# Patient Record
Sex: FEMALE | Race: WHITE | NOT HISPANIC OR LATINO | Employment: OTHER | RURAL
[De-identification: names, ages, dates, MRNs, and addresses within clinical notes are randomized per-mention and may not be internally consistent; named-entity substitution may affect disease eponyms.]

---

## 2021-07-29 DIAGNOSIS — R42 DIZZINESS: ICD-10-CM

## 2021-07-29 DIAGNOSIS — R42 VERTIGO: Primary | ICD-10-CM

## 2021-08-03 ENCOUNTER — CLINICAL SUPPORT (OUTPATIENT)
Dept: REHABILITATION | Facility: HOSPITAL | Age: 86
End: 2021-08-03
Payer: MEDICARE

## 2021-08-03 DIAGNOSIS — R42 DIZZINESS: ICD-10-CM

## 2021-08-03 DIAGNOSIS — R42 VERTIGO: ICD-10-CM

## 2021-08-03 PROCEDURE — 97110 THERAPEUTIC EXERCISES: CPT | Mod: PN

## 2021-08-03 PROCEDURE — 97161 PT EVAL LOW COMPLEX 20 MIN: CPT | Mod: PN

## 2024-06-03 ENCOUNTER — PROCEDURE VISIT (OUTPATIENT)
Dept: DERMATOLOGY | Facility: CLINIC | Age: 89
End: 2024-06-03
Payer: MEDICARE

## 2024-06-03 VITALS — HEART RATE: 87 BPM | DIASTOLIC BLOOD PRESSURE: 78 MMHG | SYSTOLIC BLOOD PRESSURE: 189 MMHG

## 2024-06-03 DIAGNOSIS — D48.9 NEOPLASM OF UNCERTAIN BEHAVIOR: ICD-10-CM

## 2024-06-03 DIAGNOSIS — C44.329 SQUAMOUS CELL CARCINOMA OF SKIN OF RIGHT CHEEK: Primary | ICD-10-CM

## 2024-06-03 PROCEDURE — 99499 UNLISTED E&M SERVICE: CPT | Mod: ,,, | Performed by: STUDENT IN AN ORGANIZED HEALTH CARE EDUCATION/TRAINING PROGRAM

## 2024-06-03 PROCEDURE — 11102 TANGNTL BX SKIN SINGLE LES: CPT | Mod: XS,,, | Performed by: STUDENT IN AN ORGANIZED HEALTH CARE EDUCATION/TRAINING PROGRAM

## 2024-06-03 PROCEDURE — 88305 TISSUE EXAM BY PATHOLOGIST: CPT | Mod: 26,,, | Performed by: PATHOLOGY

## 2024-06-03 PROCEDURE — 17311 MOHS 1 STAGE H/N/HF/G: CPT | Mod: ,,, | Performed by: STUDENT IN AN ORGANIZED HEALTH CARE EDUCATION/TRAINING PROGRAM

## 2024-06-03 PROCEDURE — 13132 CMPLX RPR F/C/C/M/N/AX/G/H/F: CPT | Mod: 51,XS,, | Performed by: STUDENT IN AN ORGANIZED HEALTH CARE EDUCATION/TRAINING PROGRAM

## 2024-06-03 PROCEDURE — 88305 TISSUE EXAM BY PATHOLOGIST: CPT | Mod: TC,SUR | Performed by: STUDENT IN AN ORGANIZED HEALTH CARE EDUCATION/TRAINING PROGRAM

## 2024-06-03 RX ORDER — LEVOTHYROXINE SODIUM 75 UG/1
75 TABLET ORAL
COMMUNITY
Start: 2024-05-23

## 2024-06-03 RX ORDER — MUPIROCIN 20 MG/G
OINTMENT TOPICAL DAILY
Qty: 22 G | Refills: 3 | Status: SHIPPED | OUTPATIENT
Start: 2024-06-03

## 2024-06-03 RX ORDER — ESTRADIOL 1 MG/1
1 TABLET ORAL
COMMUNITY
Start: 2024-03-21

## 2024-06-03 RX ORDER — SULFAMETHOXAZOLE AND TRIMETHOPRIM 800; 160 MG/1; MG/1
1 TABLET ORAL 2 TIMES DAILY
COMMUNITY
Start: 2024-02-19 | End: 2024-06-03

## 2024-06-03 NOTE — PROGRESS NOTES
Mohs Surgery Operative Note    Patient name: Donna Tadeo  Date: 06/03/2024    Mohs accession #:   Previous dermpath accession #: [id number]  Location: [facility]  Preop diagnosis:SCC  Postop diagnosis: Same  Mohs AUC score: 9  Number of stages: 1  Preop size: 1.5 x 1.5 cm   Postop size: 1.9 x 1.9 cm  Depth of defect: fat  Repair type: Complex   Amount of lidocaine used: 12 cc of 2%   Surgeon and Pathologist: CURT Rodriguez MD     Indications for Mohs Surgery    Removal of the patient's tumor is complicated by the following clinical features: Clinical area critical for tissue conservation (Area H: central face, eyelids, eyebrows, nose, lips, chin, ear, periauricular, temple, genitalia, hands, feet, ankles, nail units and areola) and Poorly-defined clinical tumor borders    Based on my medical judgement, Mohs surgery is the most appropriate treatment for this cancer compared to other treatments. I discussed alternative treatments to Mohs surgery and specifically discussed the risks and benefits of curettage, excision with permanent sections, and foregoing treatment. The rationale for Mohs was explained to the patient and consent was obtained. The risks, benefits and alternatives to therapy were discussed in detail. Specifically, the risks of infection, scarring, bleeding, prolonged wound healing, incomplete removal, allergy to anesthesia, nerve injury and recurrence were addressed. Prior to the procedure, the treatment site was clearly identified and confirmed by the patient. All components of Universal Protocol/PAUSE Rule completed. BEVERLEY Rodriguez MD operated in two distinct and integrated capacities as the surgeon and pathologist.    STAGE I:  The patient was placed on the operating table. The cancer was identified and outlined. The entire surgical field was prepped with chlorhexidine The surgical site was anesthetized using Lidocaine 1% with epinephrine 1:100,000 buffered with sodium bicarbonate 8.4% in a 1:10  ratio.    The area of clinically apparent tumor was debulked with a 2 mm curette. The layer of tissue was then surgically excised using a #15 blade and was then transferred onto a specimen sheet maintaining the orientation of the specimen. Hemostasis was obtained using monopolar electrodesiccation. The wound site was then covered with a dressing while the tissue samples were processed for examination.    The specimen was oriented, mapped and divided. Each section was then inked and processed in the Mohs lab using the Mohs protocol and submitted for frozen section. The histopathologic sections were reviewed by the surgeon in conjunction with the reference map.     Total blocks: 1 Total slides: 3    Frozen section analysis revealed: No additional tumor identified on microscopic examination by the surgeon. Histology: No malignant cells seen in the sections examined.    Additional Histologic Findings: none     REPAIR: Complex Closure    Primary Surgeon : CURT Rodriguez MD   Repair Size: 4.5 cm  Sutures:  4-0 monocryl; 5-0 prolene   Width of underminin cm   Free margin involved: no  Presence of exposed bone/cartilage/tendon/named neurovascular structure: no  Use of retention sutures: No  Indication for complex repair: Wide undermining at least the width of the defect on one side needed to facilitate a lower tension closure     The defect was identified and a marking pen was used to plan the repair. The area was infiltrated with Lidocaine 1% with epinephrine 1:100,000 buffered with sodium bicarbonate 8.4% in a 1:10 ratio, prepped with chlorhexidine and draped with sterile towels.  The wound was debeveled and undermined widely to the width listed as above. Cones were excised within relaxed skin tension lines on both sides of the defect. Hemostasis was obtained using monopolar electrodesiccation. The dermis and subcutaneous tissue were then approximated using buried vertical mattress sutures. Percutaneous simple running  sutures were carefully placed for maximum eversion and meticulous wound edge approximation. Careful attention was paid to avoid distorting any nearby free margins.    The wound was cleansed with saline and ointment was applied along the wound surface. A sterile pressure dressing was applied. Wound care instructions were given verbally and in writing. The patient left the operating suite in stable condition. Patient was informed that additional refinement of the resulting surgical scar may be used as a second stage of this reconstruction.    Isidro Rodriguez MD   Mohs Surgery, Dermatologic Oncology

## 2024-06-03 NOTE — PATIENT INSTRUCTIONS

## 2024-06-03 NOTE — PROGRESS NOTES
Mohs Surgery Consult Note    Donna Tadeo is a 89 y.o. female who is referred by Dr. Yañez for evaluation of a SCC on the right cheek. She is also concerned about a tender papule on the vertex scalp.    Recurrent skin cancer: No    Preoperative Risk Factors:  Current Anticoagulants: No  Endocarditis / Rheumatic Fever hx: No  Immunocompromised: No  Prosthetic joint: No  Congenital heart defect: No  Prosthetic heart valve: No  Diabetic: No  Transplant: No  Pacemaker: No  Defibrillator:  No  Prior problem with local anesthesia: No  Tobacco History: No]  Clindamycin Allergy: No  Pregnant: no     Transmissible Diseases:  HIV No  Hepatitis B or C  No      Exam:  Limited skin exam is normal except for a  SCC  located on the right cheek  .    Pathologic Findings:  Accession # [id number]  Diagnosis: SCC    Assessment and Plan:  Treatment Options : The various treatment options for skin cancer removal were reviewed with the patient in detail. These include Mohs surgery with its high cure rate, excisional surgery, destructive treatment, radiation therapy, and various topical therapies.  Given the indications and high cure rate, the patient has agreed to proceed with Mohs.  Risks and Benefits : The rationale for Mohs was explained to the patient. The risks and benefits to therapy were discussed in detail. Specifically, the risks of infection, scarring, bleeding, dehiscence, hematoma, prolonged wound healing, incomplete removal, allergy to anesthesia, nerve injury, inability to clear the tumor, and recurrence were addressed. The treatment site was clearly identified and confirmed by the patient.    Plan:  Mohs Micrographic Surgery  - Right cheek  Antibiotics: mupirocin ointment daily     Plan: shave biopsy  - vertex of scalp    Neoplasm of Uncertain Behavior   - tender papule located on the vertex of scalp   Ddx includes: SCC    Shave biopsy      Pre-procedure Diagnosis: as above  Post_procedure Diagnosis: same  Estimated  Blood Loss: <1cc    Findings: None  Complications: None  Specimens: to pathology      Written informed consent was obtained after discussing risks including pain, bleeding, infection, recurrence and scarring. The biopsy site was sterilely prepped with alcohol, which was allowed to dry completely, then locally infiltrated with 1% lidocaine with epinephrine, ~3 cc total. A shave biopsy was obtained using a Dermablade/15 and the specimen was sent to dermatopathology. Aluminum chloride was used for hemostasis. Antibiotic ointment and a clean dressing were applied. The patient tolerated the procedure well without complications. Verbal and written wound care instructions were given.    MD Isidro Dougherty MD   Mohs Surgery/Dermatologic Oncology

## 2024-06-05 LAB
ESTROGEN SERPL-MCNC: NORMAL PG/ML
INSULIN SERPL-ACNC: NORMAL U[IU]/ML
LAB AP GROSS DESCRIPTION: NORMAL
LAB AP LABORATORY NOTES: NORMAL
LAB AP SPEC A DDX: NORMAL
LAB AP SPEC A MORPHOLOGY: NORMAL
LAB AP SPEC A PROCEDURE: NORMAL
T3RU NFR SERPL: NORMAL %

## 2024-06-17 ENCOUNTER — PROCEDURE VISIT (OUTPATIENT)
Dept: DERMATOLOGY | Facility: CLINIC | Age: 89
End: 2024-06-17
Payer: MEDICARE

## 2024-06-17 VITALS — HEART RATE: 73 BPM | SYSTOLIC BLOOD PRESSURE: 156 MMHG | DIASTOLIC BLOOD PRESSURE: 76 MMHG

## 2024-06-17 DIAGNOSIS — D48.9 NEOPLASM OF UNCERTAIN BEHAVIOR: Primary | ICD-10-CM

## 2024-06-17 DIAGNOSIS — C44.42 SQUAMOUS CELL CARCINOMA OF SCALP: ICD-10-CM

## 2024-06-17 PROCEDURE — 99499 UNLISTED E&M SERVICE: CPT | Mod: ,,, | Performed by: STUDENT IN AN ORGANIZED HEALTH CARE EDUCATION/TRAINING PROGRAM

## 2024-06-17 PROCEDURE — 11102 TANGNTL BX SKIN SINGLE LES: CPT | Mod: XS,,, | Performed by: STUDENT IN AN ORGANIZED HEALTH CARE EDUCATION/TRAINING PROGRAM

## 2024-06-17 PROCEDURE — 17311 MOHS 1 STAGE H/N/HF/G: CPT | Mod: ,,, | Performed by: STUDENT IN AN ORGANIZED HEALTH CARE EDUCATION/TRAINING PROGRAM

## 2024-06-17 PROCEDURE — 88305 TISSUE EXAM BY PATHOLOGIST: CPT | Mod: 26,,, | Performed by: PATHOLOGY

## 2024-06-17 PROCEDURE — 13121 CMPLX RPR S/A/L 2.6-7.5 CM: CPT | Mod: 51,,, | Performed by: STUDENT IN AN ORGANIZED HEALTH CARE EDUCATION/TRAINING PROGRAM

## 2024-06-17 PROCEDURE — 88305 TISSUE EXAM BY PATHOLOGIST: CPT | Mod: TC,SUR | Performed by: STUDENT IN AN ORGANIZED HEALTH CARE EDUCATION/TRAINING PROGRAM

## 2024-06-17 NOTE — PROGRESS NOTES
Mohs Surgery Consult Note    Donna Tadeo is a 89 y.o. female who is referred by Dr. Rodriguez for evaluation of a SCC on the vertex scalp. She is also concerned about a scaly plaque in the posterior vertex scalp.    Recurrent skin cancer: No     Preoperative Risk Factors:  Current Anticoagulants: No  Endocarditis / Rheumatic Fever hx: No  Immunocompromised: No  Prosthetic joint: No  Congenital heart defect: No  Prosthetic heart valve: No  Diabetic: No  Transplant: No  Pacemaker: No  Defibrillator:  No  Prior problem with local anesthesia: No  Tobacco History: No]  Clindamycin Allergy: No  Pregnant: no      Transmissible Diseases:  HIV No  Hepatitis B or C  No          Exam:  Limited skin exam is normal except for a  SCC  located on the vertex scalp   .    Pathologic Findings:  Accession # [id number]  Diagnosis: SCC    Assessment and Plan:  Treatment Options : The various treatment options for skin cancer removal were reviewed with the patient in detail. These include Mohs surgery with its high cure rate, excisional surgery, destructive treatment, radiation therapy, and various topical therapies.  Given the indications and high cure rate, the patient has agreed to proceed with Mohs.  Risks and Benefits : The rationale for Mohs was explained to the patient. The risks and benefits to therapy were discussed in detail. Specifically, the risks of infection, scarring, bleeding, dehiscence, hematoma, prolonged wound healing, incomplete removal, allergy to anesthesia, nerve injury, inability to clear the tumor, and recurrence were addressed. The treatment site was clearly identified and confirmed by the patient.    Plan:  Mohs Micrographic Surgery  - Vertex scalp    Antibiotics: Mupirocin ointment bid x 14 days       Plan: Shave biopsy  - posterior vertex scalp    Shave biopsy      Pre-procedure Diagnosis: as above  Post_procedure Diagnosis: same  Estimated Blood Loss: <1cc    Findings: None  Complications: None  Specimens: to  pathology      Written informed consent was obtained after discussing risks including pain, bleeding, infection, recurrence and scarring. The biopsy site was sterilely prepped with alcohol, which was allowed to dry completely, then locally infiltrated with 1% lidocaine with epinephrine, ~3 cc total. A shave biopsy was obtained using a Dermablade/15 and the specimen was sent to dermatopathology. Aluminum chloride was used for hemostasis. Antibiotic ointment and a clean dressing were applied. The patient tolerated the procedure well without complications. Verbal and written wound care instructions were given.       Isidro Rodriguez MD   Mohs Surgery/Dermatologic Oncology

## 2024-06-17 NOTE — PROGRESS NOTES
Mohs Surgery Operative Note    Patient name: Donna Tadeo  Date: 06/17/2024    Mohs accession #:   Previous dermpath accession #: [id number]  Location: vertex scalp  Preop diagnosis:SCC  Postop diagnosis: Same  Mohs AUC score: 9  Number of stages: 1  Preop size: 3.0 x 1.4 cm  Postop size: 3.4 x 1.8 cm  Depth of defect: fat  Repair type: Complex   Amount of lidocaine used: 12 cc of 2% lidocaine with epi  Surgeon and Pathologist: CURT Rodriguez MD     Indications for Mohs Surgery    Removal of the patient's tumor is complicated by the following clinical features: Clinical area critical for tissue conservation (Area M: cheeks, forehead, scalp, neck, jawline, pretibial surface), Large tumor size, and Aggressive pattern on initial pathology     Based on my medical judgement, Mohs surgery is the most appropriate treatment for this cancer compared to other treatments. I discussed alternative treatments to Mohs surgery and specifically discussed the risks and benefits of curettage, excision with permanent sections, and foregoing treatment. The rationale for Mohs was explained to the patient and consent was obtained. The risks, benefits and alternatives to therapy were discussed in detail. Specifically, the risks of infection, scarring, bleeding, prolonged wound healing, incomplete removal, allergy to anesthesia, nerve injury and recurrence were addressed. Prior to the procedure, the treatment site was clearly identified and confirmed by the patient. All components of Universal Protocol/PAUSE Rule completed. BEVERLEY Rodriguez MD operated in two distinct and integrated capacities as the surgeon and pathologist.    STAGE I:  The patient was placed on the operating table. The cancer was identified and outlined. The entire surgical field was prepped with chlorhexidine The surgical site was anesthetized using Lidocaine 1% with epinephrine 1:100,000 buffered with sodium bicarbonate 8.4% in a 1:10 ratio.    The area of  clinically apparent tumor was debulked with a 2 mm curette. The layer of tissue was then surgically excised using a #15 blade and was then transferred onto a specimen sheet maintaining the orientation of the specimen. Hemostasis was obtained using monopolar electrodesiccation. The wound site was then covered with a dressing while the tissue samples were processed for examination.    The specimen was oriented, mapped and divided. Each section was then inked and processed in the Mohs lab using the Mohs protocol and submitted for frozen section. The histopathologic sections were reviewed by the surgeon in conjunction with the reference map.     Total blocks: 1 Total slides: 6    Frozen section analysis revealed: No additional tumor identified on microscopic examination by the surgeon. Histology: No malignant cells seen in the sections examined.    Additional Histologic Findings: none     REPAIR: Complex Closure    Primary Surgeon : CURT Rodriguez MD   Repair Size: 5 cm  Sutures:  3-0 monocryl; 3-0 prolene; staples   Width of underminin cm   Free margin involved: no  Presence of exposed bone/cartilage/tendon/named neurovascular structure: no   Use of retention sutures: No  Indication for complex repair: Wide undermining at least the width of the defect on one side needed to facilitate a lower tension closure     The defect was identified and a marking pen was used to plan the repair. The area was infiltrated with Lidocaine 1% with epinephrine 1:100,000 buffered with sodium bicarbonate 8.4% in a 1:10 ratio, prepped with chlorhexidine and draped with sterile towels.  The wound was debeveled and undermined widely to the width listed as above. Cones were excised within relaxed skin tension lines on both sides of the defect. Hemostasis was obtained using monopolar electrodesiccation. The dermis and subcutaneous tissue were then approximated using buried vertical mattress sutures. Percutaneous simple running sutures were  carefully placed for maximum eversion and meticulous wound edge approximation. Careful attention was paid to avoid distorting any nearby free margins.    The wound was cleansed with saline and ointment was applied along the wound surface. A sterile pressure dressing was applied. Wound care instructions were given verbally and in writing. The patient left the operating suite in stable condition. Patient was informed that additional refinement of the resulting surgical scar may be used as a second stage of this reconstruction.    Isidro Rodriguez MD   Mohs Surgery, Dermatologic Oncology

## 2024-06-17 NOTE — PATIENT INSTRUCTIONS

## 2024-06-19 PROCEDURE — G0180 MD CERTIFICATION HHA PATIENT: HCPCS | Mod: ,,, | Performed by: STUDENT IN AN ORGANIZED HEALTH CARE EDUCATION/TRAINING PROGRAM

## 2024-07-01 ENCOUNTER — CLINICAL SUPPORT (OUTPATIENT)
Dept: DERMATOLOGY | Facility: CLINIC | Age: 89
End: 2024-07-01
Payer: MEDICARE

## 2024-07-01 DIAGNOSIS — Z48.02 ENCOUNTER FOR REMOVAL OF SUTURES: Primary | ICD-10-CM

## 2024-07-01 PROCEDURE — 99024 POSTOP FOLLOW-UP VISIT: CPT | Mod: ,,, | Performed by: STUDENT IN AN ORGANIZED HEALTH CARE EDUCATION/TRAINING PROGRAM

## 2024-07-17 ENCOUNTER — EXTERNAL HOME HEALTH (OUTPATIENT)
Dept: HOME HEALTH SERVICES | Facility: HOSPITAL | Age: 89
End: 2024-07-17
Payer: MEDICARE

## 2025-01-02 ENCOUNTER — OFFICE VISIT (OUTPATIENT)
Dept: DERMATOLOGY | Facility: CLINIC | Age: OVER 89
End: 2025-01-02
Payer: MEDICARE

## 2025-01-02 DIAGNOSIS — L57.0 ACTINIC KERATOSES: Primary | ICD-10-CM

## 2025-01-02 DIAGNOSIS — Z85.828 HISTORY OF NONMELANOMA SKIN CANCER: ICD-10-CM

## 2025-01-02 NOTE — PROGRESS NOTES
[facility]  Isidro Rodriguez MD    [address] [address]  [phone]    Fax: [phone]    Patient Name: Donna Tadeo  Medical Record Number: [id number]  PCP: Calvin Yañez MD  Age: 89 y.o. : 1935  Contact: [phone] (home)     History of Present Illness:     Donna Tadeo is a 89 y.o.  female here for follow up of hx of NMSC (SCC right cheek s/p Mohs 24 and SCC vertex scalp s/p Mohs 24) Today, she is concerned about a pruritic lesion on the right arm and a lesion on her nasal tip.     The patient has no other concerns today.    Review of Systems:     Unremarkable other than mentioned above.     Physical Exam:     General: Relaxed, oriented, alert    Skin examination of the scalp, face, neck, chest, back, abdomen, upper extremities and lower extremities were normal except for as listed below      Assessment and Plan:     1. History of NMSC   Well-healed scar on right cheek and vertex scalp   No e/o recurrence   Recommend sunscreen and good photoprotection       2. Actinic Keratoses  Erythematous, scaly papules on right arm, right hand, left hand, left arm, scalp, left cheek, left ear, nasal tip, right eyebrow,       Plan: Liquid Nitrogen.  A total of 15 lesions were treated with liquid nitrogen for 2 freeze-thaw cycles lasting 5 seconds, located on the above locations.   The patient's consent was obtained including but not limited to risks of crusting, scabbing,  blistering, scarring, darker or lighter pigmentary change, recurrence, incomplete removal and infection.    - Discussed PDT for face.     Counseling.  Sun protective clothing and broad spectrum sunscreen can prevent the formation of AK.   AKs can be treated with cryotherapy, photodynamic therapy, imiquimod, topical 5-FU.  Actinic Keratoses are precancerous proliferations that occur within sun damaged skin. If untreated,  a small subset of AKs can develop into Squamous Cell  Carcinoma.        Isidro Rodriguez MD   Mohs Surgery/Dermatologic Oncology  Dermatology

## 2025-01-15 DIAGNOSIS — M17.12 OSTEOARTHRITIS OF LEFT KNEE: Primary | ICD-10-CM

## 2025-01-16 ENCOUNTER — HOSPITAL ENCOUNTER (OUTPATIENT)
Dept: RADIOLOGY | Facility: HOSPITAL | Age: OVER 89
Discharge: HOME | End: 2025-01-16
Attending: FAMILY MEDICINE
Payer: MEDICARE

## 2025-01-16 DIAGNOSIS — M25.569 KNEE PAIN: ICD-10-CM

## 2025-01-16 PROCEDURE — 73560 X-RAY EXAM OF KNEE 1 OR 2: CPT | Mod: TC,LT

## 2025-01-22 ENCOUNTER — CLINICAL SUPPORT (OUTPATIENT)
Dept: REHABILITATION | Facility: HOSPITAL | Age: OVER 89
End: 2025-01-22
Payer: MEDICARE

## 2025-01-22 DIAGNOSIS — R26.9 GAIT DISTURBANCE: ICD-10-CM

## 2025-01-22 DIAGNOSIS — M17.12 PRIMARY OSTEOARTHRITIS OF LEFT KNEE: ICD-10-CM

## 2025-01-22 DIAGNOSIS — M25.662 DECREASED ROM OF LEFT KNEE: Primary | ICD-10-CM

## 2025-01-22 PROCEDURE — 97161 PT EVAL LOW COMPLEX 20 MIN: CPT | Mod: PN

## 2025-01-22 PROCEDURE — 97110 THERAPEUTIC EXERCISES: CPT | Mod: PN

## 2025-01-22 NOTE — PROGRESS NOTES
Outpatient Rehab    Physical Therapy Evaluation (only)    Patient Name: Donna Tadeo  MRN: [id number]  YOB: 1935  Today's Date: 1/22/2025    Therapy Diagnosis:   Encounter Diagnoses   Name Primary?    Primary osteoarthritis of left knee     Decreased ROM of left knee Yes    Gait disturbance      Physician: Calvin Yañez MD    Physician Orders: Eval and Treat  Medical Diagnosis: left oa knee , gait disturbance     Visit # / Visits Authorized:  1 / pending    Date of Evaluation:  1/22/2025   Insurance Authorization Period: 1/22/2025  to pending   Plan of Care Certification:  1/22/2025 to 2/19/2025       Time In:1445   Time Out: 1330   Total time: 45     Precautions  Right Upper Extremity Weight-Bearing Status: Full weight-bearing         Subjective   History of Present Illness  Donna is a 89 y.o. female who reports to physical therapy with a chief concern of left knee pain oa ,  gait disturbance. According to the patient's chart, [email or web address]@ Donna has no past surgical history on file.    The patient reports a medical diagnosis of left knee oa , gait disturbance. The patient has experienced this issue since 01/22/25.   Diagnostic tests related to this condition: X-ray.   X-Ray Details: Impression:     Mild tri compartment DJD.     Chondrocalcinosis.    History of Present Condition/Illness: Pt states she has been having left knee pain for a few years. Pt voices she has had injections but not much improvement. Pt voices her left knee has buckled a few times on her causing loss of balance.     Activities of Daily Living  Social history was obtained from Patient.          Patient Responsibilities: Community mobility, Laundry, Personal ADL, Yard work, Driving    Previously independent with activities of daily living? Yes     Currently independent with activities of daily living? No              Pain     Patient reports a current pain level of 0/10. Pain at best is reported as 0/10. Pain at worst is reported  as 5/10.   Clinical Progression (since onset): Worsening  Pain Qualities: Aching, Discomfort, Sharp, Tightness, Dull, Throbbing, Tenderness  Pain-Relieving Factors: Rest  Pain-Aggravating Factors: Twisting, Walking, Stretching, Stress, Stair climbing, Standing, Kneeling, Bending         Treatment History  Treatments  Previously Received Treatments: No  Currently Receiving Treatments: No    Living Arrangements  Living Situation  Housing: Home independently  Living Arrangements: Alone  Support Systems: Children    Home Setup  Type of Structure: House        Employment  Employment Status: Retired          Past Medical History/Physical Systems Review:   Donna Tadeo  has no past medical history on file.    Donna Tadeo  has no past surgical history on file.    Donna has a current medication list which includes the following prescription(s): estradiol, levothyroxine, and mupirocin.    Review of patient's allergies indicates:   Allergen Reactions    Nsaids (non-steroidal anti-inflammatory drug)     Prednisone         Objective      Knee Palpation          Left Knee Palpation  Abnormal: Muscle  Left Knee Muscle Palpation Observations: weakness            Hip Range of Motion   Right Hip   Active (deg) Passive (deg) Pain   Flexion 95       Extension         ABduction         ADduction         External Rotation 90/90         External Rotation Prone         Internal Rotation 90/90         Internal Rotation Prone             Left Hip   Active (deg) Passive (deg) Pain   Flexion 90       Extension         ABduction         ADduction         External Rotation 90/90         External Rotation Prone         Internal Rotation 90/90         Internal Rotation Prone                  Knee Range of Motion   Right Knee   Active (deg) Passive (deg) Pain   Flexion 135 135     Extension 0           Left Knee   Active (deg) Passive (deg) Pain   Flexion 115 120     Extension -10 -10                        Hip Strength - Planes of Motion   Right  Strength Right Pain Left Strength Left  Pain   Flexion (L2) 3+   3+     Extension 3+   3+     ABduction 3+   3+     ADduction 3+   3+     Internal Rotation 3+   3+     External Rotation 3+   3+         Knee Strength   Right Strength Right Pain Left Strength Left  Pain   Flexion (S2) 3+   3     Prone Flexion 3+   3     Extension (L3) 3+   3            Ankle/Foot Strength - Planes of Motion   Right Strength Right Pain Left Strength Left  Pain   Dorsiflexion (L4) 4   4     Plantar Flexion (S1) 4   4     Inversion 4   4     Eversion 4   4     Great Toe Flexion 4   4     Great Toe Extension (L5) 4   4     Lesser Toes Flexion 4   4     Lesser Toes Extension 4   4                Gait Analysis  Gait Pattern: Antalgic                   Intake Outcome Measure for FOTO Survey    Therapist reviewed FOTO scores for Donna Tadeo on 1/22/2025.   FOTO report - see Media section or FOTO account episode details.     Intake Score: 32 %    Patient's spiritual, cultural, and educational needs considered and patient agreeable to plan of care and goals.     Assessment & Plan   Assessment  Donna presents with a condition of Moderate complexity.   Presentation of Symptoms: Changing  Will Comorbidities Impact Care: No       Functional Limitations: Activity tolerance, Ambulating on uneven surfaces, Standing tolerance, Range of motion, Decreased ambulation distance/endurance  Impairments: Abnormal gait, Abnormal or restricted range of motion, Abnormal muscle firing  Personal Factors Affecting Prognosis: Pain    Prognosis: Good  Assessment Details: Pt has decreased knee extension -10 degree quad lag left vs right . Pt has weakness with quad and fatigue with standing and quad weakness       Goals:   Active       Functional outcome       Patient will demonstrate independence in home program for support of progression       Start:  01/22/25    Expected End:  01/23/25               Functional outcome       Patient stated goal:          Start:   01/22/25               Pain       Patient will report pain of 0/10 demonstrating a reduction of overall pain       Start:  01/22/25    Expected End:  02/19/25               Range of Motion       Patient will achieve left knee ROM of  left knee extension quad lag to -0 degrees        Start:  01/22/25    Expected End:  02/19/25               Strength       Patient will achieve left knee flexion strength of 4/5       Start:  01/22/25    Expected End:  02/19/25            Patient will achieve left knee extension strength of 5/5       Start:  01/22/25    Expected End:  02/19/25               Strength       Patient will achieve left knee flexion strength of 5/5       Start:  01/22/25    Expected End:  03/05/25            Patient will achieve left knee extension strength of 5/5       Start:  01/22/25    Expected End:  03/05/25              Clinical Information for Insurance Authorization     Dates of Services: 01/22/2025 to 2/19/2025   Discharge Plan: Patient will be discharged from skilled physical therapy treatment once all goals have been met, patient has plateaued, or physician/insurance requests discontinuation of care. Patient will be discharged with a home exercise program.   Type of therapy: Rehabilitative  ICD-10 Diagnosis Code(s):   Which side is symptomatic? Left knee oa , gait disturbance   Surgical: No  Surgical procedure: N/A  Surgery date: N/A.  Presenting symptoms/diagnoses are unspecified in nature.  Presenting symptoms are non-radiating in nature.   The rehabilitation is not related to a diagnosis of cancer.  The rehabilitation is not related to a diagnosis of lymphedema.  Patient's clinical presentation is:  Moderate objective and functional deficits: consistent symptoms and/or symptoms that are intensified with activity with moderate loss of range of motion, strength, or ability to perform daily tasks  CPT Codes Requested:  04953 [therapeutic exercise], 10323 [neuromuscular re-education], 06673 [manual  therapy], 99325 [therapeutic activities], and 89614 [ultrasound]        Plan   Plan of care Certification: 1/22/2025 to 2/19/2025 .    Outpatient Physical Therapy 2 times weekly for 4 weeks to include the following interventions: Manual Therapy, Neuromuscular Re-ed, Patient Education, and Therapeutic Activities.     Plan of care has been reestablished with Mary CHATTERJEE,         [place], PT   1/22/2025            I CERTIFY THE NEED FOR THESE SERVICES FURNISHED UNDER THIS PLAN OF TREATMENT AND WHILE UNDER MY CARE.    Physician's comments:      Physician's Signature: ___________________________________________________